# Patient Record
Sex: MALE | Race: WHITE | Employment: FULL TIME | URBAN - METROPOLITAN AREA
[De-identification: names, ages, dates, MRNs, and addresses within clinical notes are randomized per-mention and may not be internally consistent; named-entity substitution may affect disease eponyms.]

---

## 2021-10-19 ENCOUNTER — TRANSCRIBE ORDER (OUTPATIENT)
Dept: SCHEDULING | Age: 66
End: 2021-10-19

## 2021-10-19 DIAGNOSIS — M54.16 LUMBAR RADICULITIS: Primary | ICD-10-CM

## 2021-11-22 ENCOUNTER — HOSPITAL ENCOUNTER (OUTPATIENT)
Dept: MRI IMAGING | Age: 66
Discharge: HOME | End: 2021-11-22
Attending: ORTHOPAEDIC SURGERY
Payer: MEDICARE

## 2021-11-22 DIAGNOSIS — M54.16 LUMBAR RADICULITIS: ICD-10-CM

## 2021-11-22 PROCEDURE — 72148 MRI LUMBAR SPINE W/O DYE: CPT

## 2023-07-13 ENCOUNTER — TRANSCRIBE ORDERS (OUTPATIENT)
Facility: HOSPITAL | Age: 68
End: 2023-07-13

## 2023-07-13 DIAGNOSIS — R36.1 HEMATOSPERMIA: Primary | ICD-10-CM

## 2023-09-11 ENCOUNTER — HOSPITAL ENCOUNTER (OUTPATIENT)
Facility: HOSPITAL | Age: 68
Discharge: HOME | End: 2023-09-14
Attending: UROLOGY
Payer: MEDICARE

## 2023-09-11 DIAGNOSIS — R36.1 HEMATOSPERMIA: ICD-10-CM

## 2023-09-11 PROCEDURE — A9579 GAD-BASE MR CONTRAST NOS,1ML: HCPCS

## 2023-09-11 PROCEDURE — 72197 MRI PELVIS W/O & W/DYE: CPT

## 2023-09-11 PROCEDURE — 6360000004 HC RX CONTRAST MEDICATION

## 2023-09-11 RX ORDER — 0.9 % SODIUM CHLORIDE 0.9 %
100 INTRAVENOUS SOLUTION INTRAVENOUS ONCE
Status: DISCONTINUED | OUTPATIENT
Start: 2023-09-11 | End: 2023-09-15 | Stop reason: HOSPADM

## 2023-09-11 RX ADMIN — GADOTERIDOL 20 ML: 279.3 INJECTION, SOLUTION INTRAVENOUS at 09:15

## 2025-06-12 ENCOUNTER — PROCEDURE VISIT (OUTPATIENT)
Age: 70
End: 2025-06-12
Payer: MEDICARE

## 2025-06-12 DIAGNOSIS — G62.9 POLYNEUROPATHY: Primary | ICD-10-CM

## 2025-06-12 PROCEDURE — 95886 MUSC TEST DONE W/N TEST COMP: CPT | Performed by: PSYCHIATRY & NEUROLOGY

## 2025-06-12 PROCEDURE — 95910 NRV CNDJ TEST 7-8 STUDIES: CPT | Performed by: PSYCHIATRY & NEUROLOGY

## 2025-06-12 NOTE — PROGRESS NOTES
2.8 <4.4 7.3 >5.0 14 cm Ant Lat Mall 2.8 14.0 50 >32   Site 2    2.8  6.0          Right Sup Fibular Anti Sensory (Ant Lat Mall)  30.2 °C   14 cm *NR  <4.4  >5.0 14 cm Ant Lat Mall  14.0  >32   Left Sural Anti Sensory (Lat Mall)  30.7 °C   Calf    3.8 <4.0 5.2 >5.0 Calf Lat Mall 3.8 14.0 37 >35   Right Sural Anti Sensory (Lat Mall)  30.2 °C   Calf *NR  <4.0  >5.0 Calf Lat Mall  14.0  >35     Motor Summary Table     Stim Site NR Onset (ms) Norm Onset (ms) O-P Amp (mV) Norm O-P Amp Site1 Site2 Delta-0 (ms) Dist (cm) Ming (m/s) Norm Ming (m/s)   Left Fibular Motor (Ext Dig Brev)  30.2 °C   Ankle    3.5 <6.1 5.5 >2.5 B Fib Ankle 8.1 33.0 41 >38   B Fib    11.6  3.5  Poplt B Fib 2.2 10.0 45 >40   Poplt    13.8  2.9          Right Fibular Motor (Ext Dig Brev)  30.6 °C   Ankle    4.4 <6.1 3.0 >2.5 B Fib Ankle 7.8 33.0 42 >38   B Fib    12.2  2.9  Poplt B Fib 2.2 10.0 45 >40   Poplt    14.4  2.9          Left Tibial Motor (Abd Atkins Brev)  30.8 °C   Ankle    4.9 <6.1 3.8 >3.0 Knee Ankle 8.9 42.0 47 >35   Knee    13.8  3.6          Right Tibial Motor (Abd Atkins Brev)  30.7 °C   Ankle    4.5 <6.1 4.1 >3.0 Knee Ankle 11.9 42.0 35 >35   Knee    16.4  3.9            F Wave Studies     NR F-Lat (ms) Lat Norm (ms) L-R F-Lat (ms) L-R Lat Norm   Left Tibial (Abd Hallucis)  30.2 °C      60.00 <61 0.34 <5.7   Right Tibial (Abd Hallucis)  30.3 °C      60.34 <61 0.34 <5.7     EMG     Side Muscle Nerve Root Ins Act Fibs Psw Amp Dur Poly Recrt Int Pat Comment   Right AntTibialis Dp Br Fibular L4-5 Nml Nml Nml Nml Nml 0 Nml Nml    Right Gastroc Tibial S1-2 Nml Nml Nml Nml Nml 0 Nml Nml    Right Flex Dig Long Tibial L5-S2 Nml Nml Nml Nml Nml 0 Nml Nml    Right VastusLat Femoral L2-4 Nml Nml Nml Nml Nml 0 Nml Nml    Left AntTibialis Dp Br Fibular L4-5 Nml Nml Nml Nml Nml 0 Nml Nml    Left Gastroc Tibial S1-2 Nml Nml Nml Nml Nml 0 Nml Nml    Left Flex Dig Long Tibial L5-S2 Nml Nml Nml Nml Nml 0 Nml Nml    Left VastusLat Femoral L2-4 Nml Nml Nml